# Patient Record
(demographics unavailable — no encounter records)

---

## 2024-11-25 NOTE — PLAN
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 at 23 years old, and one hosp at [place] prior to hosp at [facility], denies past SI/SAs/NSSIB, denies substance use, legal hx/hx of violence, PMH notable for Graves, hx elevated calcium, HTN, presents for treatment for schizophrenia.  On initial assessment, pt denies past symptoms of fannie or depression, though endorses psychotic symptoms with ongoing residual paranoia that is tolerable to the pt. The differential includes schizophrenia, r/o schizoaffective disorder, r/o intellectual disability (based on hx of special education in middle school, though hx is unclear). Plan to continue risperidone 2mg at bedtime, benztropine 1mg three times a day. AIMS score is 3, most notable for a pill-rolling of fingers, a likely parkinsonian side effect of risperidone that is being addressed by benztropine.  On follow up assessments: 9/5/2024: AIMS score 2 (mild upper extremity). Stable mood and psychosis symptoms, stable level of functioning. 11/25/2024:  PLAN -Discussed the diagnosis, treatment, alternatives to recommended treatment, risk Vs benefits of treatment and no treatment and alternative treatments. -Lab/other tests: pt is going to get bloodwork done with his PCP in July (advised to obtain CBC, CMP, lipid profile, A1c, TSH), to send results to writer. -Medication: Continue risperidone 2mg at bedtime, benztropine 1mg three times a day.  ANTIPSYCHOTIC MED SIDE EFFECTS: Discussed with patient side effects including but not limited to metabolic side effects, EPS, NMS, and long term motor side effects like TD and risk of QT elongation, increased risk of stroke and death in elderly patients with dementia. Benztropine side effects discussed including dry mouth, blurred vision, diplopia, confusion, hallucinations, constipation, nausea, vomiting, paralytic ileus, dilation of colon, bowel obstruction, erectile dysfunction, angle closure glaucoma, heat stroke, tachycardia, arrythmia, hypotension, urinary retention, worsening of tardive dyskinesia. -Discussed recommendation for aerobic exercise -Discussed sleep hygiene -Educated patient of importance of remaining abstinent from drugs and alcohol. -Emergency procedures were discussed: pt. educated to call 911 or go to nearest ER for worsening of symptoms/suicidal/homicidal ideation. -Referred for individual psychotherapy to learn coping skills -RTC in 12 weeks or earlier as needed -Patient given opportunity to ask questions and their questions were answered and they expressed understanding and agreement with above plan.

## 2024-11-25 NOTE — PHYSICAL EXAM
[Feeling Restless] : feeling ~L restless [FreeTextEntry4] : pill rolling of fingers [Cooperative] : cooperative [Euthymic] : euthymic [Full] : full [Clear] : clear [Linear/Goal Directed] : linear/goal directed [Average] : average [WNL] : within normal limits [de-identified] : euthymic, stable, congruent with stated mood [FreeTextEntry7] : denies suicidal ideation/intent/plan or non-suicidal self-injurious ideation/intent/plan or homicidal ideation/intent/plan some paranoia

## 2024-11-25 NOTE — PHYSICAL EXAM
[Feeling Restless] : feeling ~L restless [FreeTextEntry4] : pill rolling of fingers [Cooperative] : cooperative [Euthymic] : euthymic [Full] : full [Clear] : clear [Linear/Goal Directed] : linear/goal directed [Average] : average [WNL] : within normal limits [de-identified] : euthymic, stable, congruent with stated mood [FreeTextEntry7] : denies suicidal ideation/intent/plan or non-suicidal self-injurious ideation/intent/plan or homicidal ideation/intent/plan some paranoia

## 2024-11-25 NOTE — PLAN
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  On initial assessment, pt denies past symptoms of fannie or depression, though endorses psychotic symptoms with ongoing residual paranoia that is tolerable to the pt. The differential includes schizophrenia, r/o schizoaffective disorder, r/o intellectual disability (based on hx of special education in middle school, though hx is unclear). Plan to continue risperidone 2mg at bedtime, benztropine 1mg three times a day. AIMS score is 3, most notable for a pill-rolling of fingers, a likely parkinsonian side effect of risperidone that is being addressed by benztropine.  On follow up assessments: 9/5/2024: AIMS score 2 (mild upper extremity). Stable mood and psychosis symptoms, stable level of functioning. 11/25/2024:  PLAN -Discussed the diagnosis, treatment, alternatives to recommended treatment, risk Vs benefits of treatment and no treatment and alternative treatments. -Lab/other tests: pt is going to get bloodwork done with his PCP in July (advised to obtain CBC, CMP, lipid profile, A1c, TSH), to send results to writer. -Medication: Continue risperidone 2mg at bedtime, benztropine 1mg three times a day.  ANTIPSYCHOTIC MED SIDE EFFECTS: Discussed with patient side effects including but not limited to metabolic side effects, EPS, NMS, and long term motor side effects like TD and risk of QT elongation, increased risk of stroke and death in elderly patients with dementia. Benztropine side effects discussed including dry mouth, blurred vision, diplopia, confusion, hallucinations, constipation, nausea, vomiting, paralytic ileus, dilation of colon, bowel obstruction, erectile dysfunction, angle closure glaucoma, heat stroke, tachycardia, arrythmia, hypotension, urinary retention, worsening of tardive dyskinesia. -Discussed recommendation for aerobic exercise -Discussed sleep hygiene -Educated patient of importance of remaining abstinent from drugs and alcohol. -Emergency procedures were discussed: pt. educated to call 911 or go to nearest ER for worsening of symptoms/suicidal/homicidal ideation. -Referred for individual psychotherapy to learn coping skills -RTC in 12 weeks or earlier as needed -Patient given opportunity to ask questions and their questions were answered and they expressed understanding and agreement with above plan.

## 2024-11-25 NOTE — HISTORY OF PRESENT ILLNESS
[FreeTextEntry1] : Social:  Medications: Risperidone 2mg: [id number] three times a day:  Mood: Anxiety: Panic attacks: Motivation: Anhedonia: Appetite: Sleep: Energy: Focus: Guilt/worthlessness: Thoughts of death: Thoughts of harming self: Thoughts of harming others:  EtOH use: Drug use: Tobacco use: Caffeine use:

## 2024-11-25 NOTE — PHYSICAL EXAM
[Feeling Restless] : feeling ~L restless [FreeTextEntry4] : pill rolling of fingers [Cooperative] : cooperative [Euthymic] : euthymic [Full] : full [Clear] : clear [Linear/Goal Directed] : linear/goal directed [Average] : average [WNL] : within normal limits [de-identified] : euthymic, stable, congruent with stated mood [FreeTextEntry7] : denies suicidal ideation/intent/plan or non-suicidal self-injurious ideation/intent/plan or homicidal ideation/intent/plan some paranoia

## 2024-11-25 NOTE — HISTORY OF PRESENT ILLNESS
[FreeTextEntry1] : Social:  Medications: Risperidone 2mg: [id number] three times a day:  Mood: Anxiety: Panic attacks: Motivation: Anhedonia: Appetite: Sleep: Energy: Focus: Guilt/worthlessness: Thoughts of death: Thoughts of harming self: Thoughts of harming others:  EtOH use: Drug use: Tobacco use: Caffeine use:

## 2024-11-25 NOTE — PLAN
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  On initial assessment, pt denies past symptoms of fannie or depression, though endorses psychotic symptoms with ongoing residual paranoia that is tolerable to the pt. The differential includes schizophrenia, r/o schizoaffective disorder, r/o intellectual disability (based on hx of special education in middle school, though hx is unclear). Plan to continue risperidone 2mg at bedtime, benztropine 1mg three times a day. AIMS score is 3, most notable for a pill-rolling of fingers, a likely parkinsonian side effect of risperidone that is being addressed by benztropine.  On follow up assessments: 9/5/2024: AIMS score 2 (mild upper extremity). Stable mood and psychosis symptoms, stable level of functioning. 11/25/2024:  PLAN -Discussed the diagnosis, treatment, alternatives to recommended treatment, risk Vs benefits of treatment and no treatment and alternative treatments. -Lab/other tests: pt is going to get bloodwork done with his PCP in July (advised to obtain CBC, CMP, lipid profile, A1c, TSH), to send results to writer. -Medication: Continue risperidone 2mg at bedtime, benztropine 1mg three times a day.  ANTIPSYCHOTIC MED SIDE EFFECTS: Discussed with patient side effects including but not limited to metabolic side effects, EPS, NMS, and long term motor side effects like TD and risk of QT elongation, increased risk of stroke and death in elderly patients with dementia. Benztropine side effects discussed including dry mouth, blurred vision, diplopia, confusion, hallucinations, constipation, nausea, vomiting, paralytic ileus, dilation of colon, bowel obstruction, erectile dysfunction, angle closure glaucoma, heat stroke, tachycardia, arrythmia, hypotension, urinary retention, worsening of tardive dyskinesia. -Discussed recommendation for aerobic exercise -Discussed sleep hygiene -Educated patient of importance of remaining abstinent from drugs and alcohol. -Emergency procedures were discussed: pt. educated to call 911 or go to nearest ER for worsening of symptoms/suicidal/homicidal ideation. -Referred for individual psychotherapy to learn coping skills -RTC in 12 weeks or earlier as needed -Patient given opportunity to ask questions and their questions were answered and they expressed understanding and agreement with above plan.

## 2024-11-25 NOTE — HISTORY OF PRESENT ILLNESS
[FreeTextEntry1] : Social:  Medications: Risperidone 2mg: [id number] three times a day:  Mood: Anxiety: Panic attacks: Motivation: Anhedonia: Appetite: Sleep: Energy: Focus: Guilt/worthlessness: Thoughts of death: Thoughts of harming self: Thoughts of harming others:  EtOH use: Drug use: Tobacco use: Caffeine use:

## 2024-11-25 NOTE — DISCUSSION/SUMMARY
[FreeTextEntry1] : Writer called pt at 9:09am, nobody picked, left. Writer left a voice message reminder of today's 9am appt and advised to call writer's  as needed to reschedule should pt not be able to make it to today's appt.